# Patient Record
Sex: FEMALE | Race: BLACK OR AFRICAN AMERICAN | NOT HISPANIC OR LATINO | URBAN - METROPOLITAN AREA
[De-identification: names, ages, dates, MRNs, and addresses within clinical notes are randomized per-mention and may not be internally consistent; named-entity substitution may affect disease eponyms.]

---

## 2020-09-11 ENCOUNTER — EMERGENCY (EMERGENCY)
Facility: HOSPITAL | Age: 20
LOS: 1 days | Discharge: HOME | End: 2020-09-11
Attending: EMERGENCY MEDICINE | Admitting: EMERGENCY MEDICINE
Payer: COMMERCIAL

## 2020-09-11 VITALS
DIASTOLIC BLOOD PRESSURE: 74 MMHG | OXYGEN SATURATION: 100 % | WEIGHT: 125 LBS | TEMPERATURE: 98 F | RESPIRATION RATE: 16 BRPM | HEART RATE: 100 BPM | SYSTOLIC BLOOD PRESSURE: 104 MMHG

## 2020-09-11 PROCEDURE — 73590 X-RAY EXAM OF LOWER LEG: CPT | Mod: 26,LT

## 2020-09-11 PROCEDURE — 73590 X-RAY EXAM OF LOWER LEG: CPT

## 2020-09-11 PROCEDURE — 72100 X-RAY EXAM L-S SPINE 2/3 VWS: CPT | Mod: 26

## 2020-09-11 PROCEDURE — 73090 X-RAY EXAM OF FOREARM: CPT | Mod: 26,LT

## 2020-09-11 PROCEDURE — 72100 X-RAY EXAM L-S SPINE 2/3 VWS: CPT

## 2020-09-11 PROCEDURE — 73130 X-RAY EXAM OF HAND: CPT

## 2020-09-11 PROCEDURE — 73552 X-RAY EXAM OF FEMUR 2/>: CPT

## 2020-09-11 PROCEDURE — 73130 X-RAY EXAM OF HAND: CPT | Mod: 26,RT

## 2020-09-11 PROCEDURE — 73090 X-RAY EXAM OF FOREARM: CPT

## 2020-09-11 PROCEDURE — 99284 EMERGENCY DEPT VISIT MOD MDM: CPT | Mod: 25

## 2020-09-11 PROCEDURE — 73552 X-RAY EXAM OF FEMUR 2/>: CPT | Mod: 26,LT

## 2020-09-11 PROCEDURE — 99284 EMERGENCY DEPT VISIT MOD MDM: CPT

## 2020-09-11 RX ORDER — IBUPROFEN 200 MG
1 TABLET ORAL
Qty: 21 | Refills: 0
Start: 2020-09-11 | End: 2020-09-17

## 2020-09-11 RX ORDER — METHOCARBAMOL 500 MG/1
1 TABLET, FILM COATED ORAL
Qty: 15 | Refills: 0
Start: 2020-09-11 | End: 2020-09-15

## 2020-09-11 RX ORDER — IBUPROFEN 200 MG
600 TABLET ORAL ONCE
Refills: 0 | Status: COMPLETED | OUTPATIENT
Start: 2020-09-11 | End: 2020-09-11

## 2020-09-11 RX ADMIN — Medication 600 MILLIGRAM(S): at 14:00
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
## 2020-09-11 NOTE — ED PROVIDER NOTE - MUSCULOSKELETAL MINIMAL EXAM
no chest wall tenderness, no pelvic tenderness, no midline cervical tenderness with FROM of neck. + TTP left mid tib/fib and mid femur. + TTP left forearm, + TTP right hand 5th metacarpal. + TTP midline lumbar vertebral spine. FROM of all extremities. normal ambulation. radial and dp pulses equal and intact

## 2020-09-11 NOTE — ED PROVIDER NOTE - PROGRESS NOTE DETAILS
Patient seen leaving the ED multiple times to smoke, patient currently not in room. Patient denying pregnancy. Denies hematuria. Refusing to provide urine sample in ED. Patient feeling better, ambulating in ED. Will dc. Recommend follow up with ortho.
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
## 2020-09-11 NOTE — ED PROVIDER NOTE - PATIENT PORTAL LINK FT
You can access the FollowMyHealth Patient Portal offered by [facility] by registering at the following website: [email or web address]. By joining [employer]’s FollowMyHealth portal, you will also be able to view your health information using other applications (apps) compatible with our system.

## 2020-09-11 NOTE — ED PROVIDER NOTE - CARE PROVIDER_API CALL
Benjamin Cagle  ORTHOPAEDIC SURGERY  [address] [facility], [address] [place] [address]  Phone: [phone]  Fax: [phone]  Follow Up Time: 1-3 Days

## 2021-04-11 ENCOUNTER — EMERGENCY (EMERGENCY)
Facility: HOSPITAL | Age: 21
LOS: 1 days | Discharge: HOME | End: 2021-04-11
Attending: EMERGENCY MEDICINE
Payer: SELF-PAY

## 2021-04-11 VITALS — HEIGHT: 62 IN | WEIGHT: 100.09 LBS

## 2021-04-11 VITALS
SYSTOLIC BLOOD PRESSURE: 112 MMHG | TEMPERATURE: 98 F | HEART RATE: 75 BPM | DIASTOLIC BLOOD PRESSURE: 71 MMHG | RESPIRATION RATE: 18 BRPM | OXYGEN SATURATION: 99 %

## 2021-04-11 DIAGNOSIS — F90.9 ATTENTION-DEFICIT HYPERACTIVITY DISORDER, UNSPECIFIED TYPE: ICD-10-CM

## 2021-04-11 LAB
ALBUMIN SERPL ELPH-MCNC: 4.7 G/DL — SIGNIFICANT CHANGE UP (ref 3.3–5.2)
ALP SERPL-CCNC: 52 U/L — SIGNIFICANT CHANGE UP (ref 40–120)
ALT FLD-CCNC: 11 U/L — SIGNIFICANT CHANGE UP
ANION GAP SERPL CALC-SCNC: 12 MMOL/L — SIGNIFICANT CHANGE UP (ref 5–17)
APAP SERPL-MCNC: <3 UG/ML — LOW (ref 10–26)
AST SERPL-CCNC: 18 U/L — SIGNIFICANT CHANGE UP
BASOPHILS # BLD AUTO: 0.02 K/UL — SIGNIFICANT CHANGE UP (ref 0–0.2)
BASOPHILS NFR BLD AUTO: 0.2 % — SIGNIFICANT CHANGE UP (ref 0–2)
BILIRUB SERPL-MCNC: 0.4 MG/DL — SIGNIFICANT CHANGE UP (ref 0.4–2)
BUN SERPL-MCNC: 10 MG/DL — SIGNIFICANT CHANGE UP (ref 8–20)
CALCIUM SERPL-MCNC: 9.6 MG/DL — SIGNIFICANT CHANGE UP (ref 8.6–10.2)
CHLORIDE SERPL-SCNC: 101 MMOL/L — SIGNIFICANT CHANGE UP (ref 98–107)
CO2 SERPL-SCNC: 24 MMOL/L — SIGNIFICANT CHANGE UP (ref 22–29)
CREAT SERPL-MCNC: 0.87 MG/DL — SIGNIFICANT CHANGE UP (ref 0.5–1.3)
EOSINOPHIL # BLD AUTO: 0.28 K/UL — SIGNIFICANT CHANGE UP (ref 0–0.5)
EOSINOPHIL NFR BLD AUTO: 3.3 % — SIGNIFICANT CHANGE UP (ref 0–6)
ETHANOL SERPL-MCNC: <10 MG/DL — SIGNIFICANT CHANGE UP (ref 0–9)
GLUCOSE SERPL-MCNC: 93 MG/DL — SIGNIFICANT CHANGE UP (ref 70–99)
HCG SERPL-ACNC: <4 MIU/ML — SIGNIFICANT CHANGE UP
HCT VFR BLD CALC: 42.1 % — SIGNIFICANT CHANGE UP (ref 34.5–45)
HGB BLD-MCNC: 13.7 G/DL — SIGNIFICANT CHANGE UP (ref 11.5–15.5)
IMM GRANULOCYTES NFR BLD AUTO: 0.5 % — SIGNIFICANT CHANGE UP (ref 0–1.5)
LYMPHOCYTES # BLD AUTO: 2.58 K/UL — SIGNIFICANT CHANGE UP (ref 1–3.3)
LYMPHOCYTES # BLD AUTO: 30.8 % — SIGNIFICANT CHANGE UP (ref 13–44)
MCHC RBC-ENTMCNC: 27 PG — SIGNIFICANT CHANGE UP (ref 27–34)
MCHC RBC-ENTMCNC: 32.5 GM/DL — SIGNIFICANT CHANGE UP (ref 32–36)
MCV RBC AUTO: 83 FL — SIGNIFICANT CHANGE UP (ref 80–100)
MONOCYTES # BLD AUTO: 0.83 K/UL — SIGNIFICANT CHANGE UP (ref 0–0.9)
MONOCYTES NFR BLD AUTO: 9.9 % — SIGNIFICANT CHANGE UP (ref 2–14)
NEUTROPHILS # BLD AUTO: 4.63 K/UL — SIGNIFICANT CHANGE UP (ref 1.8–7.4)
NEUTROPHILS NFR BLD AUTO: 55.3 % — SIGNIFICANT CHANGE UP (ref 43–77)
PLATELET # BLD AUTO: 262 K/UL — SIGNIFICANT CHANGE UP (ref 150–400)
POTASSIUM SERPL-MCNC: 3.8 MMOL/L — SIGNIFICANT CHANGE UP (ref 3.5–5.3)
POTASSIUM SERPL-SCNC: 3.8 MMOL/L — SIGNIFICANT CHANGE UP (ref 3.5–5.3)
PROT SERPL-MCNC: 8.3 G/DL — SIGNIFICANT CHANGE UP (ref 6.6–8.7)
RBC # BLD: 5.07 M/UL — SIGNIFICANT CHANGE UP (ref 3.8–5.2)
RBC # FLD: 13.3 % — SIGNIFICANT CHANGE UP (ref 10.3–14.5)
SALICYLATES SERPL-MCNC: <0.6 MG/DL — LOW (ref 10–20)
SODIUM SERPL-SCNC: 137 MMOL/L — SIGNIFICANT CHANGE UP (ref 135–145)
WBC # BLD: 8.38 K/UL — SIGNIFICANT CHANGE UP (ref 3.8–10.5)
WBC # FLD AUTO: 8.38 K/UL — SIGNIFICANT CHANGE UP (ref 3.8–10.5)

## 2021-04-11 PROCEDURE — 80307 DRUG TEST PRSMV CHEM ANLYZR: CPT

## 2021-04-11 PROCEDURE — 36415 COLL VENOUS BLD VENIPUNCTURE: CPT

## 2021-04-11 PROCEDURE — 90792 PSYCH DIAG EVAL W/MED SRVCS: CPT

## 2021-04-11 PROCEDURE — 93005 ELECTROCARDIOGRAM TRACING: CPT

## 2021-04-11 PROCEDURE — 84702 CHORIONIC GONADOTROPIN TEST: CPT

## 2021-04-11 PROCEDURE — 85025 COMPLETE CBC W/AUTO DIFF WBC: CPT

## 2021-04-11 PROCEDURE — 80053 COMPREHEN METABOLIC PANEL: CPT

## 2021-04-11 PROCEDURE — 86769 SARS-COV-2 COVID-19 ANTIBODY: CPT

## 2021-04-11 PROCEDURE — 99285 EMERGENCY DEPT VISIT HI MDM: CPT

## 2021-04-11 PROCEDURE — 93010 ELECTROCARDIOGRAM REPORT: CPT

## 2021-04-11 NOTE — ED ADULT TRIAGE NOTE - CHIEF COMPLAINT QUOTE
per PD pt family called d/t pt wanting to commit SI pt aggressive at home brought in by PD denies SI at this time per PD CPAP was on diversion and brought patient here.  pt denies drug use

## 2021-04-11 NOTE — ED ADULT NURSE NOTE - ACTIVATING EVENTS/STRESSORS
Non-compliant or not receiving treatment/Substance intoxication or withdrawal/Inadequate social supports
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
## 2021-04-11 NOTE — ED PROVIDER NOTE - PATIENT PORTAL LINK FT
You can access the FollowMyHealth Patient Portal offered by [facility] by registering at the following website: [email or web address]. By joining [employer]’s FollowMyHealth portal, you will also be able to view your health information using other applications (apps) compatible with our system.
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
## 2021-04-11 NOTE — ED ADULT NURSE NOTE - OBJECTIVE STATEMENT
Patient was brought in to ED from home by [employer] for reports of making suicidal statements and combative behavior.  Patient reports she was fighting with family members.  Patient presented in  area dressed in yellow gowns.  Patient denies reported behavior stating "I don't do drugs and I'm not crazy".  Patient denies suicidal or homicidal ideation.  Patient became uncooperative with staff when informed cell phone would need to be secured.  Patient refusing to give up phone.  Patient eventually agreed to cooperate after further verbal intervention but stated she would not talk to anyone while in  area.  Patient oriented to  area and ambulated to room.
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
## 2021-04-11 NOTE — ED BEHAVIORAL HEALTH ASSESSMENT NOTE - OTHER
argument with mother Pt agreeable to return to ED if SI or feels unsafe.  Does not plan to return to mothers house, except to get belongings relational mother

## 2021-04-11 NOTE — ED PROVIDER NOTE - CLINICAL SUMMARY MEDICAL DECISION MAKING FREE TEXT BOX
21y F w/ hx bipolar, presenting after making suicidal/homicidal statements and acting aggressively at home.  Pt hostile and uncooperative on arrival, but ultimately agreed to change into gown and receive blood draw / EKG.  Will medically clear for BH evaluation.

## 2021-04-11 NOTE — ED PROVIDER NOTE - NS ED ROS FT
Constitutional: no fever, no chills  Eyes: no vision changes  ENT: no nasal congestion, no sore throat  CV: no chest pain  Resp: no cough, no shortness of breath  GI: no abdominal pain, no vomiting, no diarrhea  : no dysuria  MSK: no joint pain  Skin: no rash  Neuro: no headache  Psych: +SI, +HI

## 2021-04-11 NOTE — ED BEHAVIORAL HEALTH ASSESSMENT NOTE - ESTIMATED INTELLIGENCE
MARK received a call from Felicita in [facility]. [facility] was with another pt, so could not share information regarding pt at the time. Felicita said that one of them will come to see pt tomorrow morning.   Average

## 2021-04-11 NOTE — ED BEHAVIORAL HEALTH ASSESSMENT NOTE - DESCRIPTION
T(C): 36.9 (04-11-21 @ 19:45), Max: 36.9 (04-11-21 @ 19:45)  T(F): 98.5 (04-11-21 @ 19:45), Max: 98.5 (04-11-21 @ 19:45)  HR: 75 (04-11-21 @ 19:45) (71 - 75)  BP: 112/71 (04-11-21 @ 19:45) (112/71 - 119/79)  RR: 18 (04-11-21 @ 19:45) (18 - 18)  SpO2: 99% (04-11-21 @ 19:45) (99% - 100%) none in relationship with gf and they have a 2 yo who lives with cousin

## 2021-04-12 LAB
COVID-19 SPIKE DOMAIN AB INTERP: POSITIVE
COVID-19 SPIKE DOMAIN ANTIBODY RESULT: 1.32 U/ML — HIGH
SARS-COV-2 IGG+IGM SERPL QL IA: 1.32 U/ML — HIGH
SARS-COV-2 IGG+IGM SERPL QL IA: POSITIVE

## 2022-03-16 NOTE — ED ADULT TRIAGE NOTE - PRO INTERPRETER NEED 2
See telephone encounter. Pt. is supposed to get another dose of Tysabri while waiting for clearance from hematology/oncology. English

## 2023-06-19 NOTE — ED PROVIDER NOTE - PHYSICAL EXAMINATION
Constitutional: Awake, alert, hostile affect, refusing physical exam.  Eyes: no scleral icterus  HENT: normocephalic, atraumatic  Neck: supple  CV: pt refused heart auscultation  Pulm: non-labored respirations  Abdomen: non-distended  Extremities: no deformity  Skin: no rash, no jaundice  Neuro: AAOx3, moving all extremities equally
No

## 2024-03-16 PROBLEM — R01.1 CARDIAC MURMUR, UNSPECIFIED: Chronic | Status: ACTIVE | Noted: 2020-09-11

## 2024-11-27 NOTE — ED ADULT NURSE NOTE - NSIMPLEMENTINTERV_GEN_ALL_ED
Implemented All Universal Safety Interventions:  [place] to call system. Call bell, personal items and telephone within reach. Instruct patient to call for assistance. Room bathroom lighting operational. Non-slip footwear when patient is off stretcher. Physically safe environment: no spills, clutter or unnecessary equipment. Stretcher in lowest position, wheels locked, appropriate side rails in place. eyeglasses